# Patient Record
Sex: MALE | Race: WHITE | Employment: FULL TIME | URBAN - METROPOLITAN AREA
[De-identification: names, ages, dates, MRNs, and addresses within clinical notes are randomized per-mention and may not be internally consistent; named-entity substitution may affect disease eponyms.]

---

## 2018-02-23 ENCOUNTER — OFFICE VISIT (OUTPATIENT)
Dept: INTERNAL MEDICINE CLINIC | Age: 58
End: 2018-02-23

## 2018-03-02 ENCOUNTER — HOSPITAL ENCOUNTER (OUTPATIENT)
Dept: PHYSICAL THERAPY | Age: 58
Discharge: HOME | End: 2018-03-02
Payer: COMMERCIAL

## 2018-03-02 PROCEDURE — 97110 THERAPEUTIC EXERCISES: CPT

## 2018-03-02 PROCEDURE — 97161 PT EVAL LOW COMPLEX 20 MIN: CPT

## 2018-03-02 NOTE — PROGRESS NOTES
PHYSICAL THERAPY - DAILY TREATMENT NOTE    Patient Name: Reed Foley        Date: 3/2/2018  : 1960   YES Patient  Verified  Visit #:   1   of   3  Insurance: Payor: Dieudonne Bullock / Plan: Lashell Kulkarni PPO / Product Type: PPO /      In time: 11:05 A Out time: 12:00 P   Total Treatment Time: 55/45     Medicare Time Tracking (below)   Total Timed Codes (min):  NA 1:1 Treatment Time:  NA     TREATMENT AREA =  Left shoulder pain [M25.512]    SUBJECTIVE  Pain Level (on 0 to 10 scale):  0  / 10   Medication Changes/New allergies or changes in medical history, any new surgeries or procedures? NO    If yes, update Summary List   Subjective Functional Status/Changes:  []  No changes reported     See POC          OBJECTIVE  Modalities Rationale:    PD   min [] Estim, type/location:                                      []  att     []  unatt     []  w/US     []  w/ice    []  w/heat    min []  Mechanical Traction: type/lbs                   []  pro   []  sup   []  int   []  cont    []  before manual    []  after manual    min []  Ultrasound, settings/location:      min []  Iontophoresis w/ dexamethasone, location:                                               []  take home patch       []  in clinic    min []  Ice     []  Heat    location/position:     min []  Vasopneumatic Device, press/temp:     min []  Other:    [] Skin assessment post-treatment (if applicable):    []  intact    []  redness- no adverse reaction     []redness  adverse reaction:        10 min Therapeutic Exercise:  [x]  See flow sheet   Rationale:      increase ROM and increase strength to improve the patients ability to return to pain-free ADLs       min Manual Therapy:    Rationale:          min Therapeutic Activity:    Rationale:         min Neuromuscular Re-ed:    Rationale:       min Gait Training:    Rationale:       min Patient Education:  YES  Reviewed HEP   []  Progressed/Changed HEP based on:         Other Objective/Functional Measures:    See POC     Post Treatment Pain Level (on 0 to 10) scale:   0  / 10     ASSESSMENT  Assessment/Changes in Function:     See POC      []  See Progress Note/Recertification   Patient will continue to benefit from skilled PT services to modify and progress therapeutic interventions, address functional mobility deficits, address ROM deficits, address strength deficits, analyze and modify body mechanics/ergonomics, assess and modify postural abnormalities and instruct in home and community integration to attain remaining goals. Progress toward goals / Updated goals:    Progressing towards goals established at [place] [place] [address] Alvarado  [x]  Upgrade activities as tolerated YES Continue plan of care   []  Discharge due to :    []  Other:      Therapist: Brittni Aceves PT    Date: 3/2/2018 Time: 11:17 AM     No future appointments.

## 2018-03-02 NOTE — PROGRESS NOTES
[address]  [facility] [place] [facility]  [facility]  [facility] [address], [address] ,[address], [address]  Phone: [phone]  Fax: [phone] / [address] [facility]  Patient Name: Glory Todd : 1960   Medical   Diagnosis: Left shoulder pain [M25.512] Treatment Diagnosis: L shoulder pain   Onset Date: 2-3 mos ago     Referral Source: Anisha Herron MD [facility]): 3/2/2018   Prior Hospitalization: See medical history Provider #: [id number]   Prior Level of Function: Manageable sx with ADLs   Comorbidities: H/o HTN   Medications: Verified on Patient Summary List   The Plan of Care and following information is based on the information from the initial evaluation.   ==================================================================================  Assessment / key information:  Patient is a 59 y.o. male who presents to [facility] at [facility] with Dx of L shoulder pain. Patient reports initial onset of sx 29 years ago with worsening of sx over the last 3 mos which were of unknown etiology. Patient reports sx are intermittent in nature with worsening of sx with overextending his L arm, as with catching or throwing a ball with his son as well as prolonged sitting at his computer, such as with work activiites. He reports having X-ray of L shoulder although specifics of results are unknown. Sx improve with rest.  He reports having a stress test & previous tests to r/o heart involvement given previous h/o L sided chest pain, 2-3 years ago per patient report. Average reported pain level at 1-3/10, 8/10 at worst & 0/10 at best. Upon objective evaluation patient demonstrates full L shoulder AROM with mild discomfort with passive OP to ER & ABD. Overall L shoulder strength at 5/5 with no c/o pain upon MMT.   Cross arm test & Hawkin's tomas test were mildly (+), mild TTP along ant pec major insertion today. B scap Fair/poor sitting posture observed in unsupported sitting. Patient can benefit from PT interventions to improve ROM & strength, and decrease pain to facilitate ADLs & overall functional status.   ==================================================================================  Eval Complexity: History MEDIUM  Complexity : 1-2 comorbidities / personal factors will impact the outcome/ POC ;  Examination  MEDIUM Complexity : 3 Standardized tests and measures addressing body structure, function, activity limitation and / or participation in recreation ; Presentation LOW Complexity : Stable, uncomplicated ;  Decision Making LOW Complexity : FOTO score of ; Overall Complexity LOW   Problem List: pain affecting function, decrease ROM, decrease strength, decrease ADL/ functional abilitiies, decrease activity tolerance, decrease flexibility/ joint mobility, decrease transfer abilities and other FOTO 78 points   Treatment Plan may include any combination of the following: Therapeutic exercise, Therapeutic activities, Neuromuscular re-education, Physical agent/modality, Manual therapy, Aquatic therapy, Patient education, Self Care training, Functional mobility training, Home safety training and Other: DN prn  Patient / Family readiness to learn indicated by: asking questions, trying to perform skills and interest  Persons(s) to be included in education: patient (P)  Barriers to Learning/Limitations: None  Measures taken:    Patient Goal (s): \"learn some exercises to keep pain manageable\"   Patient self reported health status: good  Rehabilitation Potential: good   Short Term Goals: To be accomplished in  2  treatments:  1) Establish HEP to prevent further disability. 2) Patient will be able to demonstrate the appropriate sitting posture with or without use of l/s roll to facilitate sitting activities at home/work.  Long Term Goals:  To be accomplished in  3  treatments:  1) Patient to be independent & compliant with HEP in preparation for D/C.  2) Patient will demonstrates (-) impingement tests on L shoulder in preparation for return to light lifting. Frequency / Duration:   Patient to be seen  1  times per week for 2-3  treatments:  Patient / Caregiver education and instruction: self care, activity modification, brace/ splint application and exercises  G-Codes (GP): EVY  Therapist Signature: MICHAEL Strauss cert MDT Date: 7/3/4780   Certification Period: NA Time: 11:09 AM   ===========================================================================================  I certify that the above Physical Therapy Services are being furnished while the patient is under my care. I agree with the treatment plan and certify that this therapy is necessary. Physician Signature:        Date:       Time:     Please sign and return to [facility] [place] or you may fax the signed copy to [phone]. Thank you.

## 2018-03-30 NOTE — PROGRESS NOTES
[address] [facility] [address] [place], [place] [address]flash [facility] [address], [address] [place] [id number], [address]  Phone: [phone]  Fax: [phone] SUMMARY  Patient Name: Lashanda Kumar : 1960   Treatment/Medical Diagnosis: Left shoulder pain [M25.512]   Referral Source: Chriss Saldana MD     Date of Initial Visit: 3-02-18 Attended Visits: 1 Missed Visits: 1     SUMMARY OF TREATMENT  Initial evaluation only on 3-02-18 & instruction in initial [address]  Mr. Sherman Almeida was only see for initial evaluation on 3-02-18 & issued initial HEP for home. He scheduled one f/u visit on 3-27-18 which he no showed for. No further contact has been made with clinic to continue with PT, therefore patient to be discharged to home program.    Goal/Measure of Progress Goal Met? 1.  Establish HEP to prevent further disability. Status at last Eval: NA Current Status: HEP established  yes   2. Patient will be able to demonstrate the appropriate sitting posture with or without use of l/s roll to facilitate sitting activities at home/work. Status at last Eval: NA Current Status: Unable to be re-assessed n/a   3. Patient will demonstrates (-) impingement tests on L shoulder in preparation for return to light lifting. Status at last Eval: (+) for pain reproduction Current Status: Unable to be re-assessed n/a     RECOMMENDATIONS  Other: self DC    If you have any questions/comments please contact us directly at [phone]. Thank you for allowing us to assist in the care of your patient.     Therapist Signature: MICHAEL Rivas, cert MDT Date:      Time: 10:45 AM